# Patient Record
Sex: FEMALE | URBAN - METROPOLITAN AREA
[De-identification: names, ages, dates, MRNs, and addresses within clinical notes are randomized per-mention and may not be internally consistent; named-entity substitution may affect disease eponyms.]

---

## 2018-09-26 ENCOUNTER — APPOINTMENT (RX ONLY)
Dept: URBAN - METROPOLITAN AREA CLINIC 2 | Facility: CLINIC | Age: 53
Setting detail: DERMATOLOGY
End: 2018-09-26

## 2018-09-26 DIAGNOSIS — L24 IRRITANT CONTACT DERMATITIS: ICD-10-CM

## 2018-09-26 DIAGNOSIS — Z41.9 ENCOUNTER FOR PROCEDURE FOR PURPOSES OTHER THAN REMEDYING HEALTH STATE, UNSPECIFIED: ICD-10-CM

## 2018-09-26 PROBLEM — L24.9 IRRITANT CONTACT DERMATITIS, UNSPECIFIED CAUSE: Status: ACTIVE | Noted: 2018-09-26

## 2018-09-26 PROCEDURE — ? PRESCRIPTION

## 2018-09-26 PROCEDURE — ? MEDICAL CONSULTATION: DYNAMIC RHYTIDES

## 2018-09-26 PROCEDURE — 99213 OFFICE O/P EST LOW 20 MIN: CPT

## 2018-09-26 PROCEDURE — ? TOPICAL RETINOID COUNSELING

## 2018-09-26 PROCEDURE — ? PRODUCT LINE (PHARMACEUTICALS)

## 2018-09-26 PROCEDURE — ? COUNSELING

## 2018-09-26 RX ORDER — DESONIDE 0.5 MG/G
CREAM TOPICAL BID
Qty: 1 | Refills: 0 | Status: ERX | COMMUNITY
Start: 2018-09-26

## 2018-09-26 RX ORDER — CEPHALEXIN 500 MG/1
CAPSULE ORAL BID
Qty: 14 | Refills: 0 | Status: ERX | COMMUNITY
Start: 2018-09-26

## 2018-09-26 RX ADMIN — CEPHALEXIN: 500 CAPSULE ORAL at 00:00

## 2018-09-26 RX ADMIN — DESONIDE: 0.5 CREAM TOPICAL at 00:00

## 2018-09-26 ASSESSMENT — LOCATION DETAILED DESCRIPTION DERM: LOCATION DETAILED: RIGHT INFERIOR MEDIAL FOREHEAD

## 2018-09-26 ASSESSMENT — LOCATION SIMPLE DESCRIPTION DERM: LOCATION SIMPLE: RIGHT FOREHEAD

## 2018-09-26 ASSESSMENT — LOCATION ZONE DERM: LOCATION ZONE: FACE

## 2018-09-26 NOTE — PROCEDURE: PRODUCT LINE (PHARMACEUTICALS)
Product 23 Units: 0
Product 11 Price (In Dollars - Numeric Only, No Special Characters Or $): 0.00
Product 1 Units: 1
Detail Level: Zone
Product 3 Price (In Dollars - Numeric Only, No Special Characters Or $): 50.00
Name Of Product 5: Tazarotene hydrating cream 0.05%
Name Of Product 2: Melasma Emulsion
Name Of Product 3: Chris
Product 5 Application Directions: Apply to face QHS as tolerated
Product 2 Application Directions: Apply to face once at bed time x 3-4 months. Stop for 1-2 months, the restart regimen if needed.
Name Of Product 6: Tretinoin 0.05%
Send Charges To Patient Encounter: Yes
Name Of Product 1: Tretinoin 0.025%
Name Of Product 4: Rosacea triple gel
Product 1 Application Directions: Apply a thin layer to face Qnight, 2-3 times weekly, working up to nightly. Pt may have 3 refills.